# Patient Record
Sex: MALE | Employment: FULL TIME | URBAN - METROPOLITAN AREA
[De-identification: names, ages, dates, MRNs, and addresses within clinical notes are randomized per-mention and may not be internally consistent; named-entity substitution may affect disease eponyms.]

---

## 2018-03-15 ENCOUNTER — OFFICE VISIT (OUTPATIENT)
Dept: URGENT CARE | Facility: MEDICAL CENTER | Age: 28
End: 2018-03-15
Payer: COMMERCIAL

## 2018-03-15 VITALS
OXYGEN SATURATION: 98 % | WEIGHT: 211 LBS | HEIGHT: 71 IN | BODY MASS INDEX: 29.54 KG/M2 | RESPIRATION RATE: 20 BRPM | TEMPERATURE: 97.8 F | HEART RATE: 107 BPM

## 2018-03-15 DIAGNOSIS — M54.6 ACUTE LEFT-SIDED THORACIC BACK PAIN: Primary | ICD-10-CM

## 2018-03-15 PROCEDURE — 99203 OFFICE O/P NEW LOW 30 MIN: CPT | Performed by: FAMILY MEDICINE

## 2018-03-15 RX ORDER — NAPROXEN 500 MG/1
500 TABLET ORAL 2 TIMES DAILY WITH MEALS
Qty: 20 TABLET | Refills: 0 | Status: SHIPPED | OUTPATIENT
Start: 2018-03-15

## 2018-03-15 NOTE — LETTER
March 15, 2018     Patient: Sneha Gillespie   YOB: 1990   Date of Visit: 3/15/2018       To Whom it May Concern:    Sneha Gillespie was seen in my clinic on 3/15/2018  He may return to work on 3/16/2018  If you have any questions or concerns, please don't hesitate to call           Sincerely,          [facility]        CC: No Recipients

## 2018-03-15 NOTE — PROGRESS NOTES
[facility]        NAME: Jorge Jones is a 28 y o  male  : 1990    MRN: [id number]  DATE: March 15, 2018  TIME: 3:08 PM    Assessment and Plan   Acute left-sided thoracic back pain [M54 6]  1  Acute left-sided thoracic back pain  naproxen (EC NAPROSYN) 500 MG EC tablet         Patient Instructions     Patient Instructions   1  Take Naprosyn twice daily as directed with food  2  Apply heat to area  3  Rest   4  Follow up with your PCP for worsening or concerning symptoms       Chief Complaint     Chief Complaint   Patient presents with    Back Pain     x3 days , under left shoulder          History of Present Illness       29 y/o male present with pain below left scapula  He reports a similar pain 3 weeks ago that resolved 100% with heat, rest, and NSAID  He states that it began hurting yesterday again so he did some stretching exercises  This morning he states that he woke up in worse pain  He has not taken anything for pain or applied heat  The pain is aggrevated by moving the left arm and lifting  He reports that he missed work today due to the pain and is here because he needs a note for work  Back Pain   Pertinent negatives include no fever, numbness or weakness  Review of Systems   Review of Systems   Constitutional: Negative for fatigue and fever  Musculoskeletal: Positive for back pain  Skin: Negative for color change and rash  Neurological: Negative for weakness and numbness           Current Medications       Current Outpatient Prescriptions:     naproxen (EC NAPROSYN) 500 MG EC tablet, Take 1 tablet (500 mg total) by mouth 2 (two) times a day with meals, Disp: 20 tablet, Rfl: 0    Current Allergies     Allergies as of 03/15/2018    (No Known Allergies)            The following portions of the patient's history were reviewed and updated as appropriate: allergies, current medications, past family history, past medical history, past social history, past surgical history and problem list      History reviewed  No pertinent past medical history  History reviewed  No pertinent surgical history  No family history on file  Medications have been verified  Objective   Pulse (!) 107   Temp 97 8 °F (36 6 °C) (Temporal)   Resp 20   Ht 5' 11" (1 803 m)   Wt 95 7 kg (211 lb)   SpO2 98%   BMI 29 43 kg/m²        Physical Exam     Physical Exam   Constitutional: He is oriented to person, place, and time  He appears well-developed and well-nourished  He is cooperative  Cardiovascular: Regular rhythm, S1 normal, S2 normal and normal heart sounds  Tachycardia present  Pulmonary/Chest: Effort normal and breath sounds normal  No respiratory distress  Musculoskeletal:        Right shoulder: He exhibits tenderness and spasm  Arms:  Neurological: He is alert and oriented to person, place, and time  He has normal strength  Skin: Skin is warm, dry and intact

## 2018-03-15 NOTE — PATIENT INSTRUCTIONS
1  Take Naprosyn twice daily as directed with food  2  Apply heat to area  3  Rest   4  Follow up with your PCP for worsening or concerning symptoms     Back Pain   AMBULATORY CARE:   Back pain  is common  You may feel sore or stiff on one or both sides of your back  The pain may spread to your buttocks or thighs  Back pain may be caused by an injury, lack of exercise, or obesity  Repeated bending, lifting, twisting, or lifting heavy items can also cause back pain  Seek immediate care for the following symptoms:   · Pain, numbness, or weakness in one or both legs    · Pain that is so severe, you cannot walk    · Unable to control your urine or bowel movements    · Severe back pain with chest pain    · Severe back pain, nausea, and vomiting    · Severe back pain that spreads to your side or genital area  Contact your healthcare provider if:   · You have back pain that does not get better with rest and pain medicine  · You have a fever  · You have pain that worsens when you are on your back or when you rest     · You have pain that worsens when you cough or sneeze  · You lose weight without trying  · You have questions or concerns about your condition or care  Treatment for back pain  may include any of the following:  · NSAIDs , such as ibuprofen, help decrease swelling, pain, and fever  This medicine is available with or without a doctor's order  NSAIDs can cause stomach bleeding or kidney problems in certain people  If you take blood thinner medicine, always ask your healthcare provider if NSAIDs are safe for you  Always read the medicine label and follow directions  · Acetaminophen  decreases pain  It is available without a doctor's order  Ask how much to take and how often to take it  Follow directions  Acetaminophen can cause liver damage if not taken correctly  · Prescription pain medicine  may be given  Ask your healthcare provider how to take this medicine safely    Manage your back pain: · Apply ice  on your back for 15 to 20 minutes every hour or as directed  Use an ice pack, or put crushed ice in a plastic bag  Cover it with a towel  Ice helps decrease swelling and pain  · Apply heat  on your back for 20 to 30 minutes every 2 hours for as many days as directed  Heat helps decrease pain and muscle spasms  You can alternate ice and heat  · Stay active  as much as you can without causing more pain  Bed rest could make your back pain worse  Avoid heavy lifting until your pain is gone  Follow up with your healthcare provider as directed:  Write down your questions so you remember to ask them during your visits  © 2017 [facility] Information is for End User's use only and may not be sold, redistributed or otherwise used for commercial purposes  All illustrations and images included in CareNotes® are the copyrighted property of [employer]  or [employer]  The above information is an  only  It is not intended as medical advice for individual conditions or treatments  Talk to your doctor, nurse or pharmacist before following any medical regimen to see if it is safe and effective for you

## 2018-03-23 ENCOUNTER — OFFICE VISIT (OUTPATIENT)
Dept: URGENT CARE | Facility: MEDICAL CENTER | Age: 28
End: 2018-03-23
Payer: COMMERCIAL

## 2018-03-23 VITALS
RESPIRATION RATE: 18 BRPM | HEART RATE: 99 BPM | WEIGHT: 213 LBS | BODY MASS INDEX: 34.23 KG/M2 | HEIGHT: 66 IN | TEMPERATURE: 98.2 F | DIASTOLIC BLOOD PRESSURE: 78 MMHG | OXYGEN SATURATION: 97 % | SYSTOLIC BLOOD PRESSURE: 118 MMHG

## 2018-03-23 DIAGNOSIS — M54.6 ACUTE LEFT-SIDED THORACIC BACK PAIN: Primary | ICD-10-CM

## 2018-03-23 PROCEDURE — 99213 OFFICE O/P EST LOW 20 MIN: CPT | Performed by: PHYSICIAN ASSISTANT

## 2018-03-23 RX ORDER — CYCLOBENZAPRINE HCL 10 MG
10 TABLET ORAL 3 TIMES DAILY PRN
Qty: 30 TABLET | Refills: 0 | Status: SHIPPED | OUTPATIENT
Start: 2018-03-23 | End: 2018-03-30

## 2018-03-23 NOTE — PROGRESS NOTES
[facility]        NAME: Aakash Cardenas is a 28 y o  male  : 1990    MRN: [id number]  DATE: 2018  TIME: 3:25 PM    Assessment and Plan   Acute left-sided thoracic back pain [M54 6]  1  Acute left-sided thoracic back pain  cyclobenzaprine (FLEXERIL) 10 mg tablet         Patient Instructions     Cont naproxen as directed  Take muscle relaxer as directed  Alternate ice and moist heat as directed  Follow up with pcp 1-2 days  Proceed to  ER if symptoms worsen  Chief Complaint     Chief Complaint   Patient presents with    Back Pain     pain and stiffness for about 3 weeks  was seen here about 1 5 weeks ago and given naproxen  pain is not worsening, but it is not improving and he has been unable to work  pain increases with movement  pain level at its worst is a 4/10  History of Present Illness       Back Pain   This is a new problem  The current episode started 1 to 4 weeks ago  The problem occurs constantly  The problem is unchanged  The pain is present in the thoracic spine (left sided )  The quality of the pain is described as burning and aching (Made worse with twisting and prolonged sitting )  The pain does not radiate  The pain is at a severity of 4/10  The pain is mild  The symptoms are aggravated by twisting  Stiffness is present in the morning  Pertinent negatives include no abdominal pain, bladder incontinence, bowel incontinence, chest pain, dysuria, fever, headaches, leg pain, numbness, paresis, paresthesias, pelvic pain, perianal numbness, tingling, weakness or weight loss  He has tried NSAIDs for the symptoms  The treatment provided mild relief  Review of Systems   Review of Systems   Constitutional: Negative for chills, fever and weight loss  HENT: Negative  Eyes: Negative  Respiratory: Negative for chest tightness, shortness of breath and wheezing  Cardiovascular: Negative for chest pain and palpitations     Gastrointestinal: Negative for abdominal pain and bowel incontinence  Genitourinary: Negative for bladder incontinence, dysuria and pelvic pain  Musculoskeletal: Positive for back pain  Negative for joint swelling  Skin: Negative for rash  Neurological: Negative for tingling, weakness, numbness, headaches and paresthesias  Current Medications       Current Outpatient Prescriptions:     naproxen (EC NAPROSYN) 500 MG EC tablet, Take 1 tablet (500 mg total) by mouth 2 (two) times a day with meals, Disp: 20 tablet, Rfl: 0    cyclobenzaprine (FLEXERIL) 10 mg tablet, Take 1 tablet (10 mg total) by mouth 3 (three) times a day as needed for muscle spasms for up to 7 days, Disp: 30 tablet, Rfl: 0    Current Allergies     Allergies as of 03/23/2018    (No Known Allergies)            The following portions of the patient's history were reviewed and updated as appropriate: allergies, current medications, past family history, past medical history, past social history, past surgical history and problem list      History reviewed  No pertinent past medical history  History reviewed  No pertinent surgical history  Family History   Problem Relation Age of Onset    No Known Problems Mother     Hypertension Father          Medications have been verified  Objective   /78 (BP Location: Right arm, Patient Position: Sitting, Cuff Size: Standard)   Pulse 99   Temp 98 2 °F (36 8 °C) (Temporal)   Resp 18   Ht 5' 6" (1 676 m)   Wt 96 6 kg (213 lb)   SpO2 97%   BMI 34 38 kg/m²        Physical Exam     Physical Exam   Constitutional: He is oriented to person, place, and time  He appears well-developed and well-nourished  No distress  Cardiovascular: Normal rate, regular rhythm and normal heart sounds  Pulmonary/Chest: Effort normal and breath sounds normal  No respiratory distress  Musculoskeletal: He exhibits tenderness          Thoracic back: He exhibits tenderness (left para spinal muscles of thoracic spine, ), pain and spasm  He exhibits normal range of motion, no bony tenderness (No point tenderness along spine), no swelling, no edema and normal pulse    -slr, +2 DTR b/l    Neurological: He is alert and oriented to person, place, and time  Skin: No rash noted  Nursing note and vitals reviewed

## 2018-03-23 NOTE — PATIENT INSTRUCTIONS
Cont naproxen as directed  Take muscle relaxer as directed  Alternate ice and moist heat as directed  Follow up with pcp 1-2 days